# Patient Record
(demographics unavailable — no encounter records)

---

## 2024-10-16 NOTE — REASON FOR VISIT
[CV Risk Factors and Non-Cardiac Disease] : CV risk factors and non-cardiac disease [FreeTextEntry3] : Dr. Herrera [FreeTextEntry1] : This is a 67-year-old male status post bilateral knee replacement surgery, hypertension, who comes in for cardiac consultation.

## 2024-10-16 NOTE — ASSESSMENT
[FreeTextEntry1] : This is a 67-year-old male with past medical history significant of hypertension, status post right knee replacement surgery in 2022, status post left knee replacement surgery December 14, 2023 complicated by hypertension, status post appendectomy in 1994, dyspepsia, who comes in for cardiac follow-up evaluation.  He has no history of rheumatic fever.  He does not drink excessive caffeine or alcohol.  Cardiac risk factors include hypertension.  HPI: He is feeling generally well today and denies chest pain, dizziness, heart palpitations, recent episodes of syncope or falls, SOB, or dyspnea at this time.   Current Medications: Telmisartan 40 mg daily and Amlodipine 5 mg daily after dinner.   PMH: He had successful left knee replacement surgery done December 14, 2023 after which he reports increased blood pressure that remained elevated after discharge to as high as 180/95. He went to urgent care on December 22, 2023 and was started on Amlodipine 5 mg/day with improvement of his blood pressure.  Depression screening completed. Discussed negative results with patient during visit. Patient will follow-up with their primary care provider for mental health management if needed in the future.  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    BLOOD PRESSURE: Better controlled in today's visit.    BLOOD WORK: -New blood work was done 10/16/2024 to evaluate lipid profile, CBC, BMP, hepatic function, A1C and TSH. -Lipid panel done February 2024 demonstrated triglyceride 120, cholesterol 135, HDL 41, LDL 72, non-HDL 94, LDL direct 76.    TESTING/REPORTS: -EKG done 10/16/2024 demonstrated regular sinus rhythm rate 63 bpm otherwise unremarkable.   -Echocardiogram done March 2024 demonstrated normal left ventricular systolic function EF 66%, minimal to mild tricuspid regurgitation, minimal mitral regurgitation.   -Electrocardiogram done February 6, 2024 demonstrated normal sinus rhythm rate 78 bpm is otherwise unremarkable.   PLAN: -He will continue his current medications and contact the office if problems arise before next follow-up appointment. -He will schedule an exercise stress test to evaluate for significant coronary artery disease. -He will follow-up with his PCP routinely.   I have discussed the plan of care with ROMINA CONN and he will follow up in 4-6 months. They are compliant with all of their medications.     The patient understands that aerobic exercises must be increased to 40 minutes 4 times/week and a detailed discussion of lifestyle modification was done today.   The patient has a good understanding of the diagnosis, treatment plan and lifestyle modification.   They will contact me at the office for any questions with their care or any changes in their health status.   The patient was discussed with supervision physician Dr. Anton De La Cruz at the time of the visit and the plan of care will be carried out as noted above.     HERNAN George NP

## 2024-10-16 NOTE — DISCUSSION/SUMMARY
[FreeTextEntry1] : Dr. De La Cruz-(PRIOR VISIT and PMH WITH Dr. De La Cruz): This is a 67-year-old male with past medical history significant of hypertension, status post right knee replacement surgery in 2022, status post left knee replacement surgery December 14, 2023 complicated by hypertension, status post appendectomy in 1994, dyspepsia, who comes in for cardiac consultation. He denies chest pain, shortness of breath, dizziness or syncope.  He has no history of rheumatic fever.  He does not drink excessive caffeine or alcohol. Cardiac risk factors include hypertension. He had successful left knee replacement surgery December 14, 2023.  He reports that his blood pressure remained elevated after hospital discharge to as high as 180/95.  He went to urgent care December 22nd 2023, and was started on amlodipine 5 mg/day with improvement of his blood pressure. He had a normal cardiac catheterization in 2023 at [facility]. Electrocardiogram done February 6, 2024 demonstrated normal sinus rhythm rate 78 bpm is otherwise unremarkable. The patient's blood pressure is elevated today and he will start Micardis 40 mg in the morning and switch his amlodipine to 5 mg after dinner. Lifestyle modification including salt restriction was reinforced.  He is starting to increase his aerobic activity which should help improve his blood pressure. He does have a systolic murmur and have asked him to schedule an echo Doppler examination to evaluate his left ventricular function, chamber size, murmur, rule out mitral valve prolapse and rule out hypertrophy. The patient will follow-up with me in 6 weeks to reevaluate his blood pressure. The patient understands that aerobic exercises must be increased to 40 minutes 4 times per week. A detailed discussion of lifestyle modification was done today. The patient has a good understanding of the diagnosis, and treatment plan. Lifestyle modification was also outlined.

## 2025-04-29 NOTE — ASSESSMENT
[FreeTextEntry1] : This is a 67-year-old male with past medical history significant of hypertension, status post right knee replacement surgery in 2022, status post left knee replacement surgery December 14, 2023 complicated by hypertension, status post appendectomy in 1994, dyspepsia, who comes in for cardiac follow-up evaluation.  He has no history of rheumatic fever.  He does not drink excessive caffeine or alcohol.  Cardiac risk factors include hypertension.  HPI: He is feeling generally well today and denies chest pain, dizziness, heart palpitations, recent episodes of syncope or falls, SOB, or dyspnea at this time.   Current Medications: Telmisartan 40 mg daily and Amlodipine 5 mg daily after dinner.   PMH: He had successful left knee replacement surgery done December 14, 2023 after which he reports increased blood pressure that remained elevated after discharge to as high as 180/95. He went to urgent care on December 22, 2023 and was started on Amlodipine 5 mg/day with improvement of his blood pressure.  Depression screening completed. Discussed negative results with patient during visit. Patient will follow-up with their primary care provider for mental health management if needed in the future.  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    BLOOD PRESSURE: Better controlled in today's visit.    BLOOD WORK: -New blood work was done 10/16/2024 to evaluate lipid profile, CBC, BMP, hepatic function, A1C and TSH. -Lipid panel done February 2024 demonstrated triglyceride 120, cholesterol 135, HDL 41, LDL 72, non-HDL 94, LDL direct 76.    TESTING/REPORTS: -EKG done 10/16/2024 demonstrated regular sinus rhythm rate 63 bpm otherwise unremarkable.   -Echocardiogram done March 2024 demonstrated normal left ventricular systolic function EF 66%, minimal to mild tricuspid regurgitation, minimal mitral regurgitation.   -Electrocardiogram done February 6, 2024 demonstrated normal sinus rhythm rate 78 bpm is otherwise unremarkable.   PLAN: -He will continue his current medications and contact the office if problems arise before next follow-up appointment. -He will schedule an exercise stress test to evaluate for significant coronary artery disease. -He will follow-up with his PCP routinely.   I have discussed the plan of care with ROMINA CONN and he will follow up in 4-6 months. They are compliant with all of their medications.     The patient understands that aerobic exercises must be increased to 40 minutes 4 times/week and a detailed discussion of lifestyle modification was done today.   The patient has a good understanding of the diagnosis, treatment plan and lifestyle modification.   They will contact me at the office for any questions with their care or any changes in their health status.   The patient was discussed with supervision physician Dr. Anton De La Cruz at the time of the visit and the plan of care will be carried out as noted above.     HERNAN George NP

## 2025-04-29 NOTE — REASON FOR VISIT
[CV Risk Factors and Non-Cardiac Disease] : CV risk factors and non-cardiac disease [FreeTextEntry3] : Dr. Herrera [FreeTextEntry1] : 68-year-old male status post bilateral knee replacement surgery, hypertension, who comes in for cardiac follow up.  67-year-old male with past medical history significant of hypertension, status post right knee replacement surgery in 2022, status post left knee replacement surgery December 14, 2023 complicated by hypertension, status post appendectomy in 1994, dyspepsia, who comes in for cardiac consultation. He denies chest pain, shortness of breath, dizziness or syncope. He has no history of rheumatic fever. He does not drink excessive caffeine or alcohol. Cardiac risk factors include hypertension. He had successful left knee replacement surgery December 14, 2023. He reports that his blood pressure remained elevated after hospital discharge to as high as 180/95. He went to urgent care December 22nd 2023, and was started on amlodipine 5 mg/day with improvement of his blood pressure. He had a normal cardiac catheterization in 2023 at [facility].

## 2025-04-29 NOTE — DISCUSSION/SUMMARY
[FreeTextEntry1] : This is a 68-year-old male with past medical history significant of hypertension, status post right knee replacement surgery in 2022, status post left knee replacement surgery December 14, 2023 complicated by hypertension, status post appendectomy in 1994, dyspepsia, who comes in for cardiac consultation. He denies chest pain, shortness of breath, dizziness or syncope.   He has no history of rheumatic fever.  He does not drink excessive caffeine or alcohol. Cardiac risk factors include hypertension. The patient had a normal exercise stress test April 29, 2025.  His blood pressure response was elevated. He will increase his telmisartan to 80 mg daily and follow-up in 6 to 8 weeks to check his blood pressure. Lifestyle modification including salt restriction was reinforced. He will continue his amlodipine 5 mg after dinner. He had successful left knee replacement surgery December 14, 2023.  He reports that his blood pressure remained elevated after hospital discharge to as high as 180/95.  He went to urgent care December 22nd 2023, and was started on amlodipine 5 mg/day with improvement of his blood pressure. He had a normal cardiac catheterization in 2023 at [facility]. Electrocardiogram done February 6, 2024 demonstrated normal sinus rhythm rate 78 bpm is otherwise unremarkable. The patient's blood pressure is elevated today and he will start Micardis 40 mg in the morning and switch his amlodipine to 5 mg after dinner. Lifestyle modification including salt restriction was reinforced.  He is starting to increase his aerobic activity which should help improve his blood pressure. He does have a systolic murmur and have asked him to schedule an echo Doppler examination to evaluate his left ventricular function, chamber size, murmur, rule out mitral valve prolapse and rule out hypertrophy. The patient will follow-up with me in 6 weeks to reevaluate his blood pressure. The patient understands that aerobic exercises must be increased to 40 minutes 4 times per week. A detailed discussion of lifestyle modification was done today. The patient has a good understanding of the diagnosis, and treatment plan. Lifestyle modification was also outlined.

## 2025-07-29 NOTE — DISCUSSION/SUMMARY
[FreeTextEntry1] : Dr. De La Cruz-(PRIOR VISIT and PMH WITH Dr. De La Cruz): This is a 68-year-old male with past medical history significant of hypertension, status post right knee replacement surgery in 2022, status post left knee replacement surgery December 14, 2023 complicated by hypertension, status post appendectomy in 1994, dyspepsia, who comes in for cardiac consultation. He denies chest pain, shortness of breath, dizziness or syncope.   He has no history of rheumatic fever.  He does not drink excessive caffeine or alcohol. Cardiac risk factors include hypertension. The patient had a normal exercise stress test April 29, 2025.  His blood pressure response was elevated. He will increase his telmisartan to 80 mg daily and follow-up in 6 to 8 weeks to check his blood pressure. Lifestyle modification including salt restriction was reinforced. He will continue his amlodipine 5 mg after dinner. He had successful left knee replacement surgery December 14, 2023.  He reports that his blood pressure remained elevated after hospital discharge to as high as 180/95.  He went to urgent care December 22nd 2023, and was started on amlodipine 5 mg/day with improvement of his blood pressure. He had a normal cardiac catheterization in 2023 at [facility]. Electrocardiogram done February 6, 2024 demonstrated normal sinus rhythm rate 78 bpm is otherwise unremarkable. The patient's blood pressure is elevated today and he will start Micardis 40 mg in the morning and switch his amlodipine to 5 mg after dinner. Lifestyle modification including salt restriction was reinforced.  He is starting to increase his aerobic activity which should help improve his blood pressure. He does have a systolic murmur and have asked him to schedule an echo Doppler examination to evaluate his left ventricular function, chamber size, murmur, rule out mitral valve prolapse and rule out hypertrophy. The patient will follow-up with me in 6 weeks to reevaluate his blood pressure. The patient understands that aerobic exercises must be increased to 40 minutes 4 times per week. A detailed discussion of lifestyle modification was done today. The patient has a good understanding of the diagnosis, and treatment plan. Lifestyle modification was also outlined.

## 2025-07-29 NOTE — ASSESSMENT
[FreeTextEntry1] : This is a 68-year-old male with past medical history significant of hypertension, status post right knee replacement surgery in 2022, status post left knee replacement surgery December 14, 2023 complicated by hypertension, status post appendectomy in 1994, dyspepsia, who comes in for cardiac follow-up evaluation.  He has no history of rheumatic fever.  He does not drink excessive caffeine or alcohol.  Cardiac risk factors include hypertension.  HPI: Presents for a BP check after increasing Telmisartan to 80 mg daily at April 2025 visit. He is feeling generally well today and denies chest pain, dizziness, heart palpitations, recent episodes of syncope or falls, SOB, or dyspnea at this time.   Current Medications: Telmisartan 80 mg daily and Amlodipine 5 mg daily after dinner.   Reports heavy snoring and interrupted sleep with wife complaints of possible apneic episodes overnight. Recommended he complete a home sleep study to evaluate for KHLOE.   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    BLOOD PRESSURE: Better controlled.    BLOOD WORK: -New blood work was done 07/29/2025 to evaluate lipid profile, CBC, BMP, hepatic function, A1C and TSH. -Lipid panel done October 2024 demonstrating triglycerides 69, cholesterol 138, HDL 46, LDL 78, Non-HDL 92, LDL direct 80.  -Lipid panel done February 2024 demonstrated triglyceride 120, cholesterol 135, HDL 41, LDL 72, non-HDL 94, LDL direct 76.    TESTING/REPORTS: -The patient had a normal exercise stress test April 29, 2025.  -EKG done 10/16/2024 demonstrated regular sinus rhythm rate 63 bpm otherwise unremarkable.   -Echocardiogram done March 2024 demonstrated normal left ventricular systolic function EF 66%, minimal to mild tricuspid regurgitation, minimal mitral regurgitation.   -Electrocardiogram done February 6, 2024 demonstrated normal sinus rhythm rate 78 bpm is otherwise unremarkable.   PLAN: -He will continue his current medications and contact the office if problems arise before next follow-up appointment. -He will schedule a HST to evaluate for KHLOE.  -He will follow-up with his PCP routinely.    I have discussed the plan of care with ROMINA CONN and he will follow up in 4-6 months. They are compliant with all of their medications.     The patient understands that aerobic exercises must be increased to 40 minutes 4 times/week and a detailed discussion of lifestyle modification was done today.   The patient has a good understanding of the diagnosis, treatment plan and lifestyle modification.   They will contact me at the office for any questions with their care or any changes in their health status.   The patient was discussed with supervision physician Dr. Anton De La Cruz at the time of the visit and the plan of care will be carried out as noted above.     HERNAN George NP